# Patient Record
Sex: MALE | Race: WHITE
[De-identification: names, ages, dates, MRNs, and addresses within clinical notes are randomized per-mention and may not be internally consistent; named-entity substitution may affect disease eponyms.]

---

## 2018-07-17 ENCOUNTER — HOSPITAL ENCOUNTER (EMERGENCY)
Dept: HOSPITAL 25 - UCCORT | Age: 23
Discharge: HOME | End: 2018-07-17
Payer: COMMERCIAL

## 2018-07-17 VITALS — SYSTOLIC BLOOD PRESSURE: 146 MMHG | DIASTOLIC BLOOD PRESSURE: 74 MMHG
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
## 2018-07-17 DIAGNOSIS — S90.812A: ICD-10-CM
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
## 2018-07-17 DIAGNOSIS — F17.210: ICD-10-CM
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
## 2018-07-17 DIAGNOSIS — S93.602A: Primary | ICD-10-CM

## 2018-07-17 PROCEDURE — G0463 HOSPITAL OUTPT CLINIC VISIT: HCPCS

## 2018-07-17 PROCEDURE — 99211 OFF/OP EST MAY X REQ PHY/QHP: CPT

## 2018-07-17 NOTE — RAD
Indication: LEFT foot bruising and swelling following injury on Sunday. Pain at the fourth

and fifth digits.



Comparison: No relevant prior exams available [place] [facility] [place].



Technique: AP, lateral, and oblique views LEFT foot. 



REPORT AND IMPRESSION: 

#.   Soft tissue swelling most prominent about the lateral forefoot. Negative for fracture

or malalignment.
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
